# Patient Record
(demographics unavailable — no encounter records)

---

## 2024-12-04 NOTE — HISTORY OF PRESENT ILLNESS
[postmenopausal] : postmenopausal [Y] : Positive pregnancy history [Currently Active] : currently active [Mammogramdate] : 11/23 [TextBox_19] : BREAST MRI 09/2024 [BreastSonogramDate] : 11/23 [PapSmeardate] : 2023 [BoneDensityDate] : 2022 [ColonoscopyDate] : 2022 [TextBox_43] : DUE SPRING 2025 [PGHxTotal] : 3 [[facility]] : 3 [[facility] Centeriving] : 3 [FreeTextEntry1] :  2X V-BACS

## 2025-07-01 NOTE — HISTORY OF PRESENT ILLNESS
[de-identified] : GARRETT PHILLIPS  is a 60 year F here today for LANA knee pain.  Pain has been present for few years now and is located  anteriorly and  laterally    Injury: N Instability: N Clicking/popping: Y Does knee lock up: N Swelling/effusions: Y Exacerbating activities/motions: N  Previous treatment: Surgery: N NSAIDs: N PT:  N Injections: CSI Brace: N Activity mods: N   Occupation: retired  Recreational Activities: [ ]

## 2025-07-01 NOTE — IMAGING
[de-identified] : Gait: Mildly antalgic Alignment: neutral Scars: none    B/L Knee: Tenderness: m/l patellofemoral joint line ROM: 5-100 Crepitus: Present Effusion: Present  Warmth: mild   Ligament Exam:  Lachman: neg  Post Drawer: neg  Valgus stress: neg at 0 and 30 degrees Varus stress: neg at 0 and 30 degrees Pivot shift: neg Dial test: neg at 30 degrees, neg at 90 degrees     Strength: Quads: 5/5 Hamstrin/5 DF/PF/EHL 5/5   Sensation grossly intact in all dermatomes, DP/PT 2+, brisk capillary refill distally  [Bilateral] : knee bilaterally [All Views] : anteroposterior, lateral, skyline, and anteroposterior standing [Advanced patellofemoral OA] : Advanced patellofemoral OA

## 2025-07-01 NOTE — DISCUSSION/SUMMARY
[de-identified] : Eryn has severe bilateral patellofemoral arthritis.  She has had prior steroid injections which have helped her.  Her pain is not severe enough that she would like to proceed with surgery at this point and we discussed conservative care with gel injections and possible physical therapy.  I submitted for approval for the gel shots and we will see her back after to begin her series.  All of her questions were answered she is in agreement with this plan.

## 2025-07-22 NOTE — HISTORY OF PRESENT ILLNESS
[de-identified] : 07/22/2025: Euflexxa #1 B/L knees  GARRETT PHILLIPS  is a 60 year F here today for LANA knee pain.  Pain has been present for few years now and is located  anteriorly and  laterally    Injury: N Instability: N Clicking/popping: Y Does knee lock up: N Swelling/effusions: Y Exacerbating activities/motions: N  Previous treatment: Surgery: N NSAIDs: N PT:  N Injections: CSI Brace: N Activity mods: N   Occupation: retired  Recreational Activities: [ ]

## 2025-07-22 NOTE — IMAGING
[Bilateral] : knee bilaterally [All Views] : anteroposterior, lateral, skyline, and anteroposterior standing [Advanced patellofemoral OA] : Advanced patellofemoral OA [de-identified] : Gait: Mildly antalgic Alignment: neutral Scars: none    B/L Knee: Tenderness: m/l patellofemoral joint line ROM: 5-100 Crepitus: Present Effusion: Present  Warmth: mild   Ligament Exam:  Lachman: neg  Post Drawer: neg  Valgus stress: neg at 0 and 30 degrees Varus stress: neg at 0 and 30 degrees Pivot shift: neg Dial test: neg at 30 degrees, neg at 90 degrees     Strength: Quads: 5/5 Hamstrin/5 DF/PF/EHL 5/5   Sensation grossly intact in all dermatomes, DP/PT 2+, brisk capillary refill distally

## 2025-07-22 NOTE — DISCUSSION/SUMMARY
[de-identified] : Bilateral knee Euflexxa today, tolerated well Ice and NSAIDs as needed Follow-up 1 week for repeat injections All questions answered she is in agreement with this plan

## 2025-07-22 NOTE — PROCEDURE
[FreeTextEntry1] : Bilateral knee Euflexxa [FreeTextEntry2] : Pain [FreeTextEntry3] : Risks and benefits of the injection were discussed with the patient including infection, bleeding, allergic reactions, worsening of symptoms, and possible neurovascular damage.  They stated understanding and were agreeable to proceed.  An inferolateral portal was marked at the junction of the inferior and lateral borders of the patella and the R knee was sterilely prepped with betadine and ETOH.  The inferolateral portal was then used to inject Euflexxa.  The injection site was cleaned with ETOH and a sterile band aid was applied.  Identical procedure was then performed on the contralateral side.  Patient tolerated the procedure well without any apparent complications.  Counseled on concerning signs/symptoms after injection and to call office should any problems arise.

## 2025-07-29 NOTE — IMAGING
[Bilateral] : knee bilaterally [All Views] : anteroposterior, lateral, skyline, and anteroposterior standing [Advanced patellofemoral OA] : Advanced patellofemoral OA [de-identified] : Gait: Mildly antalgic Alignment: neutral Scars: none    B/L Knee: Tenderness: m/l patellofemoral joint line ROM: 5-100 Crepitus: Present Effusion: Present  Warmth: mild   Ligament Exam:  Lachman: neg  Post Drawer: neg  Valgus stress: neg at 0 and 30 degrees Varus stress: neg at 0 and 30 degrees Pivot shift: neg Dial test: neg at 30 degrees, neg at 90 degrees     Strength: Quads: 5/5 Hamstrin/5 DF/PF/EHL 5/5   Sensation grossly intact in all dermatomes, DP/PT 2+, brisk capillary refill distally

## 2025-07-29 NOTE — HISTORY OF PRESENT ILLNESS
[de-identified] : 7/29/25- euflexxa #2 LANA knees  07/22/2025: Euflexxa #1 B/L knees  GARRETT PHILLIPS  is a 60 year F here today for LANA knee pain.  Pain has been present for few years now and is located  anteriorly and  laterally    Injury: N Instability: N Clicking/popping: Y Does knee lock up: N Swelling/effusions: Y Exacerbating activities/motions: N  Previous treatment: Surgery: N NSAIDs: N PT:  N Injections: CSI Brace: N Activity mods: N   Occupation: retired  Recreational Activities: [ ]

## 2025-07-29 NOTE — DISCUSSION/SUMMARY
[de-identified] : Bilateral knee Euflexxa today, tolerated well Ice and NSAIDs as needed Follow-up 1 week for repeat injections All questions answered she is in agreement with this plan yes